# Patient Record
Sex: MALE | Race: BLACK OR AFRICAN AMERICAN | Employment: FULL TIME | URBAN - METROPOLITAN AREA
[De-identification: names, ages, dates, MRNs, and addresses within clinical notes are randomized per-mention and may not be internally consistent; named-entity substitution may affect disease eponyms.]

---

## 2020-03-20 ENCOUNTER — HOSPITAL ENCOUNTER (OUTPATIENT)
Age: 40
Discharge: HOME | End: 2020-03-20
Attending: EMERGENCY MEDICINE
Payer: COMMERCIAL

## 2020-03-20 VITALS
DIASTOLIC BLOOD PRESSURE: 86 MMHG | OXYGEN SATURATION: 98 % | RESPIRATION RATE: 18 BRPM | TEMPERATURE: 99 F | SYSTOLIC BLOOD PRESSURE: 128 MMHG | HEART RATE: 75 BPM

## 2020-03-20 DIAGNOSIS — Z00.00 GENERAL MEDICAL EXAMINATION: Primary | ICD-10-CM

## 2020-03-20 PROCEDURE — 99202 OFFICE O/P NEW SF 15 MIN: CPT

## 2020-03-20 NOTE — ED PROVIDER NOTES
Patient Seen in: [address]      History   Patient presents with:  Note For Work    Stated Complaint: RETURN TO WORK NOTE    HPI    44-year-old male patient presents requesting a note to go back to work.   Apparently had MDM     Patient instructed to refrain from working if he has any symptoms of cough, congestion, fever, body aches, shortness of breath, diarrhea or any other signs of illness.               Disposition and Plan     Clinical Impression:  General me

## 2020-03-20 NOTE — ED INITIAL ASSESSMENT (HPI)
Pt states temp was taken at work on Monday and had a slightly elevated temp. Pt was told to stay home until fever subsided. Pt denies any symptoms at this time but need a note for work.

## (undated) NOTE — LETTER
Date & Time: 3/20/2020, 2:30 PM  Patient: Christina Avina  Encounter Provider(s):    Soy Puri MD       To Whom It May Concern:    Unruly Huynh was seen and treated in our department on 3/20/2020.  He can return to work and can return to work w